# Patient Record
Sex: FEMALE | NOT HISPANIC OR LATINO
[De-identification: names, ages, dates, MRNs, and addresses within clinical notes are randomized per-mention and may not be internally consistent; named-entity substitution may affect disease eponyms.]

---

## 2019-06-19 PROBLEM — R94.6 THYROID FUNCTION STUDY ABNORMALITY: Status: ACTIVE | Noted: 2019-06-19

## 2019-06-19 PROBLEM — D50.9 IRON DEFICIENCY ANEMIA, UNSPECIFIED IRON DEFICIENCY ANEMIA TYPE: Status: ACTIVE | Noted: 2019-06-19

## 2019-06-19 PROBLEM — E55.9 VITAMIN D DEFICIENCY: Status: ACTIVE | Noted: 2019-06-19

## 2020-08-05 PROBLEM — R94.6 THYROID FUNCTION STUDY ABNORMALITY: Status: RESOLVED | Noted: 2019-06-19 | Resolved: 2020-08-05

## 2020-08-05 PROBLEM — Z30.09 COUNSELING FOR BIRTH CONTROL, ORAL CONTRACEPTIVES: Status: ACTIVE | Noted: 2020-08-05

## 2022-01-10 PROBLEM — Z30.09 COUNSELING FOR BIRTH CONTROL, ORAL CONTRACEPTIVES: Status: RESOLVED | Noted: 2020-08-05 | Resolved: 2022-01-10

## 2022-01-10 PROBLEM — F33.2 SEVERE EPISODE OF RECURRENT MAJOR DEPRESSIVE DISORDER, WITHOUT PSYCHOTIC FEATURES (HCC): Status: ACTIVE | Noted: 2022-01-10

## 2022-01-10 PROBLEM — R45.89 THOUGHTS OF SELF HARM: Status: ACTIVE | Noted: 2022-01-10

## 2022-08-08 ENCOUNTER — TELEPHONE (OUTPATIENT)
Dept: SCHEDULING | Age: 22
End: 2022-08-08

## 2022-08-09 ENCOUNTER — OFFICE VISIT (OUTPATIENT)
Dept: URGENT CARE | Age: 22
End: 2022-08-09

## 2022-08-09 DIAGNOSIS — Z11.1 SCREENING-PULMONARY TB: Primary | ICD-10-CM

## 2022-08-09 PROCEDURE — 86580 TB INTRADERMAL TEST: CPT | Performed by: NURSE PRACTITIONER

## 2022-08-10 ENCOUNTER — TELEPHONE (OUTPATIENT)
Dept: SCHEDULING | Age: 22
End: 2022-08-10

## 2022-08-11 ENCOUNTER — OFFICE VISIT (OUTPATIENT)
Dept: URGENT CARE | Age: 22
End: 2022-08-11

## 2022-08-11 VITALS
WEIGHT: 126 LBS | DIASTOLIC BLOOD PRESSURE: 74 MMHG | RESPIRATION RATE: 20 BRPM | HEART RATE: 84 BPM | BODY MASS INDEX: 20.99 KG/M2 | HEIGHT: 65 IN | SYSTOLIC BLOOD PRESSURE: 100 MMHG | TEMPERATURE: 98.7 F

## 2022-08-11 DIAGNOSIS — Z02.1 PRE-EMPLOYMENT HEALTH SCREENING EXAMINATION: Primary | ICD-10-CM

## 2022-08-11 DIAGNOSIS — Z11.1 ENCOUNTER FOR PPD SKIN TEST READING: ICD-10-CM

## 2022-08-11 PROBLEM — D50.9 IRON DEFICIENCY ANEMIA: Status: ACTIVE | Noted: 2019-06-19

## 2022-08-11 PROBLEM — R45.89 THOUGHTS OF SELF HARM: Status: ACTIVE | Noted: 2022-01-10

## 2022-08-11 PROBLEM — F33.2 SEVERE EPISODE OF RECURRENT MAJOR DEPRESSIVE DISORDER, WITHOUT PSYCHOTIC FEATURES (CMD): Status: ACTIVE | Noted: 2022-01-10

## 2022-08-11 PROBLEM — E55.9 VITAMIN D DEFICIENCY: Status: ACTIVE | Noted: 2019-06-19

## 2022-08-11 LAB — INDURATION: 0 MM (ref 0–?)

## 2022-08-11 PROCEDURE — X0945 SELF PAY APN OR PA PERFORMED ADMINISTRATIVE PHYSICAL: HCPCS | Performed by: NURSE PRACTITIONER

## 2022-08-11 RX ORDER — NORETHINDRONE ACETATE AND ETHINYL ESTRADIOL 1MG-20(21)
1 KIT ORAL DAILY
COMMUNITY
Start: 2022-06-13

## 2022-08-11 RX ORDER — DULOXETIN HYDROCHLORIDE 20 MG/1
20 CAPSULE, DELAYED RELEASE ORAL DAILY
COMMUNITY
Start: 2022-05-17

## 2022-08-11 RX ORDER — DEXTROAMPHETAMINE SACCHARATE, AMPHETAMINE ASPARTATE, DEXTROAMPHETAMINE SULFATE AND AMPHETAMINE SULFATE 2.5; 2.5; 2.5; 2.5 MG/1; MG/1; MG/1; MG/1
1 TABLET ORAL DAILY
COMMUNITY
Start: 2022-05-11

## 2023-11-14 ENCOUNTER — WALK IN (OUTPATIENT)
Dept: URGENT CARE | Age: 23
End: 2023-11-14

## 2023-11-14 ENCOUNTER — IMAGING SERVICES (OUTPATIENT)
Dept: GENERAL RADIOLOGY | Age: 23
End: 2023-11-14
Attending: FAMILY MEDICINE

## 2023-11-14 VITALS
SYSTOLIC BLOOD PRESSURE: 109 MMHG | OXYGEN SATURATION: 100 % | RESPIRATION RATE: 14 BRPM | HEART RATE: 104 BPM | DIASTOLIC BLOOD PRESSURE: 70 MMHG | TEMPERATURE: 98.3 F

## 2023-11-14 DIAGNOSIS — R05.8 OTHER COUGH: ICD-10-CM

## 2023-11-14 DIAGNOSIS — J98.01 BRONCHOSPASM, ACUTE: ICD-10-CM

## 2023-11-14 DIAGNOSIS — J06.9 ACUTE UPPER RESPIRATORY INFECTION, UNSPECIFIED: Primary | ICD-10-CM

## 2023-11-14 PROCEDURE — 71046 X-RAY EXAM CHEST 2 VIEWS: CPT | Performed by: RADIOLOGY

## 2023-11-14 PROCEDURE — 99203 OFFICE O/P NEW LOW 30 MIN: CPT | Performed by: FAMILY MEDICINE

## 2023-11-14 RX ORDER — SULFAMETHOXAZOLE AND TRIMETHOPRIM 800; 160 MG/1; MG/1
1 TABLET ORAL 2 TIMES DAILY
Qty: 20 TABLET | Refills: 0 | Status: SHIPPED | OUTPATIENT
Start: 2023-11-14 | End: 2023-11-24

## 2023-11-14 RX ORDER — FLUOXETINE 10 MG/1
10 CAPSULE ORAL DAILY
COMMUNITY
Start: 2023-10-18

## 2023-11-14 RX ORDER — PREDNISONE 20 MG/1
40 TABLET ORAL DAILY
Qty: 10 TABLET | Refills: 0 | Status: SHIPPED | OUTPATIENT
Start: 2023-11-14 | End: 2023-11-19

## 2023-11-14 RX ORDER — ALBUTEROL SULFATE 90 UG/1
AEROSOL, METERED RESPIRATORY (INHALATION)
Qty: 1 EACH | Refills: 0 | Status: SHIPPED | OUTPATIENT
Start: 2023-11-14

## 2023-11-14 RX ORDER — DEXTROMETHORPHAN HYDROBROMIDE AND PROMETHAZINE HYDROCHLORIDE 15; 6.25 MG/5ML; MG/5ML
5 SYRUP ORAL 4 TIMES DAILY PRN
Qty: 120 ML | Refills: 0 | Status: SHIPPED | OUTPATIENT
Start: 2023-11-14 | End: 2023-11-24

## 2023-11-14 ASSESSMENT — PAIN SCALES - GENERAL: PAINLEVEL: 0

## 2025-06-24 ENCOUNTER — OFFICE VISIT (OUTPATIENT)
Dept: FAMILY MEDICINE CLINIC | Facility: CLINIC | Age: 25
End: 2025-06-24
Payer: COMMERCIAL

## 2025-06-24 VITALS
HEART RATE: 90 BPM | BODY MASS INDEX: 21.23 KG/M2 | TEMPERATURE: 97 F | SYSTOLIC BLOOD PRESSURE: 110 MMHG | HEIGHT: 65.5 IN | WEIGHT: 129 LBS | OXYGEN SATURATION: 99 % | DIASTOLIC BLOOD PRESSURE: 72 MMHG | RESPIRATION RATE: 16 BRPM

## 2025-06-24 DIAGNOSIS — Z11.3 SCREEN FOR SEXUALLY TRANSMITTED DISEASES: ICD-10-CM

## 2025-06-24 DIAGNOSIS — E55.9 VITAMIN D DEFICIENCY: ICD-10-CM

## 2025-06-24 DIAGNOSIS — R45.89 THOUGHTS OF SELF HARM: ICD-10-CM

## 2025-06-24 DIAGNOSIS — Z30.09 COUNSELING FOR BIRTH CONTROL, ORAL CONTRACEPTIVES: ICD-10-CM

## 2025-06-24 DIAGNOSIS — D50.9 IRON DEFICIENCY ANEMIA, UNSPECIFIED IRON DEFICIENCY ANEMIA TYPE: ICD-10-CM

## 2025-06-24 DIAGNOSIS — Z00.01 ENCOUNTER FOR GENERAL ADULT MEDICAL EXAMINATION WITH ABNORMAL FINDINGS: Primary | ICD-10-CM

## 2025-06-24 DIAGNOSIS — F33.1 MODERATE EPISODE OF RECURRENT MAJOR DEPRESSIVE DISORDER (HCC): ICD-10-CM

## 2025-06-24 DIAGNOSIS — F90.2 ATTENTION DEFICIT HYPERACTIVITY DISORDER (ADHD), COMBINED TYPE: ICD-10-CM

## 2025-06-24 DIAGNOSIS — Z12.4 ENCOUNTER FOR SCREENING FOR CERVICAL CANCER: ICD-10-CM

## 2025-06-24 PROCEDURE — 87591 N.GONORRHOEAE DNA AMP PROB: CPT | Performed by: FAMILY MEDICINE

## 2025-06-24 PROCEDURE — 88175 CYTOPATH C/V AUTO FLUID REDO: CPT | Performed by: FAMILY MEDICINE

## 2025-06-24 PROCEDURE — 99385 PREV VISIT NEW AGE 18-39: CPT | Performed by: FAMILY MEDICINE

## 2025-06-24 PROCEDURE — 87491 CHLMYD TRACH DNA AMP PROBE: CPT | Performed by: FAMILY MEDICINE

## 2025-06-24 RX ORDER — NORETHINDRONE ACETATE AND ETHINYL ESTRADIOL 1MG-20(21)
1 KIT ORAL DAILY
Qty: 84 TABLET | Refills: 2 | Status: SHIPPED | OUTPATIENT
Start: 2025-06-24

## 2025-06-24 RX ORDER — DEXTROAMPHETAMINE SACCHARATE, AMPHETAMINE ASPARTATE MONOHYDRATE, DEXTROAMPHETAMINE SULFATE AND AMPHETAMINE SULFATE 7.5; 7.5; 7.5; 7.5 MG/1; MG/1; MG/1; MG/1
30 CAPSULE, EXTENDED RELEASE ORAL EVERY MORNING
COMMUNITY

## 2025-06-24 RX ORDER — ALBUTEROL SULFATE 90 UG/1
INHALANT RESPIRATORY (INHALATION)
COMMUNITY
Start: 2023-11-14

## 2025-06-24 RX ORDER — FLUOXETINE 10 MG/1
10 CAPSULE ORAL DAILY
COMMUNITY

## 2025-06-24 NOTE — PROGRESS NOTES
The following individual(s) verbally consented to be recorded using ambient AI listening technology and understand that they can each withdraw their consent to this listening technology at any point by asking the clinician to turn off or pause the recording:    Patient name: Oseas Cunningham   Additional names:

## 2025-06-24 NOTE — PROGRESS NOTES
Family Medicine Progress Note    Assessment & Plan:     Follow-Up: Return for as needed.     Assessment & Plan  Encounter for general adult medical examination with abnormal findings  Wellness Exam done today and routine Preventative Care discussed as noted below.   -Pap smear: 06/24/25   -G&C testing: ordered  -Contraception:  OCPs   -Immunizations:  up-to-date   -Routine labs ordered.   -Healthy eating habits and regular exercise encouraged   Orders:    CBC With Differential With Platelet    Comp Metabolic Panel (14)    TSH W Reflex To Free T4; Future; Expected date: 06/24/2025    Lipid Panel    Vitamin D deficiency  Recheck   Orders:    Vitamin D; Future; Expected date: 06/24/2025    Encounter for screening for cervical cancer  Orders:    ThinPrep PAP with HPV Reflex Request B; Future; Expected date: 06/24/2025    ThinPrep PAP with HPV Reflex Request B    Screen for sexually transmitted diseases  Orders:    Chlamydia/Gc Amplification; Future; Expected date: 06/24/2025    Chlamydia/Gc Amplification    Counseling for birth control, oral contraceptives  Chronic, Stable, contin meds as below   Orders:    Norethin Ace-Eth Estrad-FE; Take 1 tablet by mouth daily.  Dispense: 84 tablet; Refill: 2    Iron deficiency anemia, unspecified iron deficiency anemia type  On prior labs, recheck Ferritin   Orders:    Ferritin    Thoughts of self harm  Moderate episode of recurrent major depressive disorder (HCC)  Attention deficit hyperactivity disorder (ADHD), combined type   Depression well-controlled with Prozac 10 mg, but acute exacerbation due to recent breakup. Interested in therapy for mental health support. Passive SI, no intent or plan.    - Provide referrals to Francia (CBT) and Judith (alternative therapies).  - Advise to contact office if therapy referral needed.            FOLLOW-UP: Return for as needed.     Subjective:    Subjective:   CC: Establish Care  History of Present Illness:  History obtained from patient.    Oseas Cunningham is a 25 year old female who presents for [facility]   Annual- 2024- Dr. Bajwa       History of Present Illness    ANNUAL PHYSICAL  Menses: Regular without any break through bleeding or concerning symptoms.   LMP: Patient's last menstrual period was 06/10/2025.  Concern for STI: Denies   Contraception:  Ocps   Cervical Cancer Screening-01/10/2022   PMH of Abnormal Pap: History of ASCUS-  Exercise: generally tries to be active; exercise here and there  Healthy eating habits:  Well-rounded---   Tobacco: denies   Immunizations: up-to-date     She has a history of depression and has been under psychiatric care since high school, starting around age 17-18. Currently, she is on Prozac 10 mg and Adderall 30 mg, which have been effective in managing her symptoms. However, following a breakup last week, she is experiencing increased mental health challenges, with acute symptoms persisting for the past three weeks. She feels significant emotional distress and blames herself for the breakup.    She works as a  at an elementary school, which she finds both challenging and rewarding. Currently, she is on summer break, which she feels is beneficial given her recent mental health struggles.             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 last pap: 01/10/2022, last mammogram: -,  ,   Colonoscopy: -      History/Other:   ROS-Per HPI     Problem List:  Problem List[1]    Current Medications:  Current Medications[2]   Past Medical History:  Past Medical History[3]   Past Surgical History:  Past Surgical History[4]   Family History:  Family History[5]   Social History:  Short Social Hx on File[6]     Allergies:  Allergies[7]         Objective:    Objective:   VITALS: /72   Pulse 90   Temp 97 °F (36.1 °C) (Temporal)   Resp 16   Ht 5' 5.5\" (1.664 m)   Wt 129 lb (58.5 kg)   LMP 06/10/2025   SpO2 99%   BMI 21.14 kg/m²      BP Readings from Last 3 Encounters:   06/24/25 110/72   01/10/22 110/70   08/11/20 102/60     Wt Readings from Last 3 Encounters:   06/24/25 129 lb (58.5 kg)   01/10/22 130 lb 2 oz (59 kg)   08/11/20 128 lb 4 oz (58.2 kg)         PHYSICAL EXAM  GEN: pleasant, well-appearing in NAD, AOX3  SKIN: no visible rashes, lesions, or evidence of trauma  HEENT: PERRL, EOMI, moist mucous membranes, oropharynx clear, TM clear, nares patent, no Thyromegaly or nodule.   CV: RRR, no murmurs or abnl heart sounds   PULM: Clear to auscultation, No wheezes, rales, rhonchi.  Non-labored breathing.  ABD: Soft, non-tender, non-distended, + BS, no rigidity/guarding  EXT:  Warm, well perfused, no lower extremity edema  NEURO: CNs grossly intact, no focal weakness  MSK: moves all 4 extremities without difficulty  PSYCH: mood and affect are appropriate       PELVIC EXAM: Chaperone offered and declined.   Vulva: no masses, tenderness or lesions,   Vagina: normal mucosa and rugae, no lesions  Cervix: physiologic discharge, no cervical lesions or presence of irritation/inflammation  Uterus: normal size, shape and nontender  Adnexa: normal size, nontender and no masses.              Angelita Mason DO    NOTE TO PATIENT: The 21st Century Cures Act makes clinical notes like these available to patients in the interest of transparency. Clinical notes are medical documents used by physicians and care providers to communicate with each other. These documents include medical language and terminology, abbreviations, and treatment information that may sound technical and at times possibly unfamiliar. In addition, at times, the verbiage may appear blunt or direct. These documents are one tool providers use to communicate  relevant information and clinical opinions of the care providers in a way that allows common understanding of the clinical context.           [1]   Patient Active Problem List  Diagnosis    Social phobia    Iron deficiency anemia, unspecified iron deficiency anemia type    Vitamin D deficiency    Attention deficit disorder    Depressive disorder, not elsewhere classified    Tic disorder, unspecified    Social anxiety disorder    Severe episode of recurrent major depressive disorder, without psychotic features (HCC)    Thoughts of self harm   [2]   Current Outpatient Medications   Medication Sig Dispense Refill    FLUoxetine 10 MG Oral Cap Take 1 capsule (10 mg total) by mouth daily.      albuterol 108 (90 Base) MCG/ACT Inhalation Aero Soln Take 2 inhalations every 4 hours for the next 7-10 days then wean as tolerated.      amphetamine-dextroamphetamine ER 30 MG Oral Capsule SR 24 Hr Take 1 capsule (30 mg total) by mouth every morning.      Norethin Ace-Eth Estrad-FE 1-20 MG-MCG Oral Tab Take 1 tablet by mouth daily. 84 tablet 2    IRON OR      [3]   Past Medical History:   Allergic rhinitis    Anxiety    Atypical squamous cells of undetermined significance (ASCUS) on Papanicolaou smear of cervix    Depression    Thyroid disorder    Unspecified hypothyroidism   [4]   Past Surgical History:  Procedure Laterality Date    Cyst removal      Other      ear tubes   [5]   Family History  Problem Relation Age of Onset    Asthma Father     Diabetes Father     Dementia Maternal Grandmother         Alzheimer’s Disease   [6]   Social History  Socioeconomic History    Marital status: Single   Tobacco Use    Smoking status: Never    Smokeless tobacco: Never   Vaping Use    Vaping status: Never Used   Substance and Sexual Activity    Alcohol use: Yes     Alcohol/week: 4.0 standard drinks of alcohol     Types: 4 Standard drinks or equivalent per week     Comment: Social/ weekend drinking    Drug use: No    Sexual activity: Yes      Partners: Male     Birth control/protection: Pill   Other Topics Concern    Caffeine Concern No    Exercise Yes     Comment: Every once in a while    Seat Belt Yes    Special Diet No    Stress Concern Yes    Weight Concern No     Social Drivers of Health     Food Insecurity: No Food Insecurity (6/24/2025)    NCSS - Food Insecurity     Worried About Running Out of Food in the Last Year: No     Ran Out of Food in the Last Year: No   Transportation Needs: No Transportation Needs (6/24/2025)    NCSS - Transportation     Lack of Transportation: No   Housing Stability: Not At Risk (6/24/2025)    NCSS - Housing/Utilities     Has Housing: Yes     Worried About Losing Housing: No     Unable to Get Utilities: No   [7]   Allergies  Allergen Reactions    Omnicef [Cefdinir] RASH

## 2025-06-24 NOTE — PATIENT INSTRUCTIONS
[facility]  [address]    [address] [address]  Call [phone]  [place]: [phone]  Telehealth: [phone]  Fax: [phone]   Francia Cohen, Jet Hernandez is a Postdoctoral Fellow who earned her  Doctorate in Clinical Psychology (Psy.D.) from [employer] [facility]. Francia works with individuals across the adult lifespan, from young adults (age 18+) to older adults (age 65+).    Therapeutic Focus & Approach:  Francia understands that finding a therapist and prioritizing your mental health can feel overwhelming. She views therapy as a collaborative journey where together, you can explore underlying thoughts, emotions, and behaviors (Cognitive Behavioral Therapy) that may be hindering your goals and overall life values (Acceptance and Commitment Therapy). Francia deeply appreciates the therapeutic relationship (Relational Cultural Theory) and offers unconditional support, empathy, and empowerment (Person Centered Therapy). She believes therapy should be a space cultivated by you and for you, and she values both the therapist and client stepping into therapy as their authentic selves.     Francia also aims to understand how past experiences may influence your current self (Psychodynamic Theory), including exploration of things like cultural influences, trauma, family dynamics, and self-esteem. She acknowledges that this exploration can sometimes feel uncomfortable, and she’s committed to providing you with coping skills to manage this discomfort.     Francia works with clients facing a wide range of concerns, such as anxiety, depression, grief and loss, trauma, identity development, relationship issues, life transitions, stress management, caregiver burden, aging, chronic illness and pain, and weight management. Together with her clients, Francia is honored to navigate life’s challenges to create a path towards greater fulfillment, healing, and overall well-being              [facility]  [phone]  [address] [place] [address] [address], [place] [address]  [email or web address]

## 2025-06-24 NOTE — ASSESSMENT & PLAN NOTE
Depression well-controlled with Prozac 10 mg, but acute exacerbation due to recent breakup. Interested in therapy for mental health support. Passive SI, no intent or plan.    - Provide referrals to Francia (CBT) and Judith (alternative therapies).  - Advise to contact office if therapy referral needed.

## 2025-06-25 LAB
C TRACH DNA SPEC QL NAA+PROBE: NEGATIVE
N GONORRHOEA DNA SPEC QL NAA+PROBE: NEGATIVE

## 2025-07-02 LAB
ALBUMIN SERPL-MCNC: 4.6 G/DL (ref 3.2–4.8)
ALBUMIN/GLOB SERPL: 1.5 {RATIO} (ref 1–2)
ALP LIVER SERPL-CCNC: 67 U/L (ref 37–98)
ALT SERPL-CCNC: 12 U/L (ref 10–49)
ANION GAP SERPL CALC-SCNC: 7 MMOL/L (ref 0–18)
AST SERPL-CCNC: 19 U/L (ref ?–34)
BASOPHILS # BLD AUTO: 0.05 X10(3) UL (ref 0–0.2)
BASOPHILS NFR BLD AUTO: 0.9 %
BILIRUB SERPL-MCNC: 0.4 MG/DL (ref 0.3–1.2)
BUN BLD-MCNC: 9 MG/DL (ref 9–23)
CALCIUM BLD-MCNC: 9.7 MG/DL (ref 8.7–10.6)
CHLORIDE SERPL-SCNC: 103 MMOL/L (ref 98–112)
CHOLEST SERPL-MCNC: 192 MG/DL (ref ?–200)
CO2 SERPL-SCNC: 27 MMOL/L (ref 21–32)
CREAT BLD-MCNC: 0.92 MG/DL (ref 0.55–1.02)
DEPRECATED HBV CORE AB SER IA-ACNC: 30 NG/ML (ref 50–306)
EGFRCR SERPLBLD CKD-EPI 2021: 88 ML/MIN/1.73M2 (ref 60–?)
EOSINOPHIL # BLD AUTO: 0.12 X10(3) UL (ref 0–0.7)
EOSINOPHIL NFR BLD AUTO: 2.2 %
ERYTHROCYTE [DISTWIDTH] IN BLOOD BY AUTOMATED COUNT: 13.1 %
FASTING PATIENT LIPID ANSWER: NO
FASTING STATUS PATIENT QL REPORTED: NO
GLOBULIN PLAS-MCNC: 3 G/DL (ref 2–3.5)
GLUCOSE BLD-MCNC: 88 MG/DL (ref 70–99)
HCT VFR BLD AUTO: 43.2 % (ref 35–48)
HDLC SERPL-MCNC: 49 MG/DL (ref 40–59)
HGB BLD-MCNC: 14 G/DL (ref 12–16)
IMM GRANULOCYTES # BLD AUTO: 0.02 X10(3) UL (ref 0–1)
IMM GRANULOCYTES NFR BLD: 0.4 %
LDLC SERPL CALC-MCNC: 108 MG/DL (ref ?–100)
LYMPHOCYTES # BLD AUTO: 1.93 X10(3) UL (ref 1–4)
LYMPHOCYTES NFR BLD AUTO: 35.2 %
MCH RBC QN AUTO: 29.5 PG (ref 26–34)
MCHC RBC AUTO-ENTMCNC: 32.4 G/DL (ref 31–37)
MCV RBC AUTO: 91.1 FL (ref 80–100)
MONOCYTES # BLD AUTO: 0.36 X10(3) UL (ref 0.1–1)
MONOCYTES NFR BLD AUTO: 6.6 %
NEUTROPHILS # BLD AUTO: 3 X10 (3) UL (ref 1.5–7.7)
NEUTROPHILS # BLD AUTO: 3 X10(3) UL (ref 1.5–7.7)
NEUTROPHILS NFR BLD AUTO: 54.7 %
NONHDLC SERPL-MCNC: 143 MG/DL (ref ?–130)
OSMOLALITY SERPL CALC.SUM OF ELEC: 282 MOSM/KG (ref 275–295)
PLATELET # BLD AUTO: 426 10(3)UL (ref 150–450)
POTASSIUM SERPL-SCNC: 4.6 MMOL/L (ref 3.5–5.1)
PROT SERPL-MCNC: 7.6 G/DL (ref 5.7–8.2)
RBC # BLD AUTO: 4.74 X10(6)UL (ref 3.8–5.3)
SODIUM SERPL-SCNC: 137 MMOL/L (ref 136–145)
TRIGL SERPL-MCNC: 204 MG/DL (ref 30–149)
VLDLC SERPL CALC-MCNC: 35 MG/DL (ref 0–30)
WBC # BLD AUTO: 5.5 X10(3) UL (ref 4–11)